# Patient Record
Sex: FEMALE | Race: WHITE
[De-identification: names, ages, dates, MRNs, and addresses within clinical notes are randomized per-mention and may not be internally consistent; named-entity substitution may affect disease eponyms.]

---

## 2021-05-28 ENCOUNTER — HOSPITAL ENCOUNTER (OUTPATIENT)
Dept: HOSPITAL 75 - PREOP | Age: 86
LOS: 4 days | Discharge: HOME | End: 2021-06-01
Attending: SPECIALIST
Payer: MEDICARE

## 2021-05-28 VITALS — WEIGHT: 180.34 LBS | HEIGHT: 66.02 IN | BODY MASS INDEX: 28.98 KG/M2

## 2021-05-28 DIAGNOSIS — Z01.818: Primary | ICD-10-CM

## 2021-06-04 ENCOUNTER — HOSPITAL ENCOUNTER (OUTPATIENT)
Dept: HOSPITAL 75 - SDC | Age: 86
Discharge: HOME | End: 2021-06-04
Attending: SPECIALIST
Payer: MEDICARE

## 2021-06-04 VITALS — DIASTOLIC BLOOD PRESSURE: 78 MMHG | SYSTOLIC BLOOD PRESSURE: 138 MMHG

## 2021-06-04 VITALS — SYSTOLIC BLOOD PRESSURE: 145 MMHG | DIASTOLIC BLOOD PRESSURE: 91 MMHG

## 2021-06-04 VITALS — HEIGHT: 66.02 IN | BODY MASS INDEX: 28.98 KG/M2 | WEIGHT: 180.34 LBS

## 2021-06-04 DIAGNOSIS — H91.8X9: ICD-10-CM

## 2021-06-04 DIAGNOSIS — Z79.82: ICD-10-CM

## 2021-06-04 DIAGNOSIS — Z79.899: ICD-10-CM

## 2021-06-04 DIAGNOSIS — H25.11: Primary | ICD-10-CM

## 2021-06-04 DIAGNOSIS — I10: ICD-10-CM

## 2021-06-04 DIAGNOSIS — M19.90: ICD-10-CM

## 2021-06-04 PROCEDURE — 66984 XCAPSL CTRC RMVL W/O ECP: CPT

## 2021-06-04 RX ADMIN — TROPICAMIDE SCH ML: 10 SOLUTION/ DROPS OPHTHALMIC at 07:43

## 2021-06-04 RX ADMIN — TETRACAINE HYDROCHLORIDE PRN ML: 5 SOLUTION OPHTHALMIC at 07:43

## 2021-06-04 RX ADMIN — TETRACAINE HYDROCHLORIDE PRN ML: 5 SOLUTION OPHTHALMIC at 07:37

## 2021-06-04 RX ADMIN — TETRACAINE HYDROCHLORIDE PRN ML: 5 SOLUTION OPHTHALMIC at 07:19

## 2021-06-04 RX ADMIN — PHENYLEPHRINE HYDROCHLORIDE SCH ML: 100 SOLUTION/ DROPS OPHTHALMIC at 07:37

## 2021-06-04 RX ADMIN — PHENYLEPHRINE HYDROCHLORIDE SCH ML: 100 SOLUTION/ DROPS OPHTHALMIC at 07:30

## 2021-06-04 RX ADMIN — TETRACAINE HYDROCHLORIDE PRN ML: 5 SOLUTION OPHTHALMIC at 07:30

## 2021-06-04 RX ADMIN — TROPICAMIDE SCH ML: 10 SOLUTION/ DROPS OPHTHALMIC at 07:37

## 2021-06-04 RX ADMIN — PHENYLEPHRINE HYDROCHLORIDE SCH ML: 100 SOLUTION/ DROPS OPHTHALMIC at 07:43

## 2021-06-04 RX ADMIN — TROPICAMIDE SCH ML: 10 SOLUTION/ DROPS OPHTHALMIC at 07:30

## 2021-06-04 NOTE — OPHTHALMOLOGIST PRE-OP NOTE
Pre-Operative Progress Note


H&P Reviewed


The H&P was reviewed, patient examined and no changes noted.


Date H&P Reviewed:  Jun 4, 2021


Time H&P Reviewed:  08:16


Pre-Op Dx


Cataract, Right Eye











MARCO GARCIA MD              Jun 4, 2021 08:16

## 2021-06-04 NOTE — ANESTHESIA-GENERAL POST-OP
MAC


Patient Condition


Mental Status/LOC:  Same as Preop


Cardiovascular:  Satisfactory


Nausea/Vomiting:  Absent


Respiratory:  Satisfactory


Pain:  Controlled


Complications:  Absent





Post Op Complications


Complications


None





Follow Up Care/Instructions


Patient Instructions


None needed.





Anesthesiology Discharge Order


Discharge Order


Patient is doing well, no complaints, stable vital signs, no apparent adverse 

anesthesia problems.   


No complications reported per nursing.











BECKY WEAVER CRNA             Jun 4, 2021 14:00

## 2021-06-04 NOTE — OPHTHALMOLOGY OPERATIVE REPORT
Cataract removal/placement IOL


PREOPERATIVE DIAGNOSIS: Cataract Right Eye


POSTOPERATIVE DIAGNOSIS: Cataract Right Eye





PROCEDURE: Cataract removal and placement of posterior chamber implant, right 

eye





SURGEON: Socrates Garcia 





ANESTHESIA: Topical with sedation





COMPLICATIONS: None





ESTIMATED BLOOD LOSS: Minimal 





DESCRIPTION OF PROCEDURE:


After proper informed consent was obtained, the patient, a 86 female, was taken 

to the Operating Room and the right eye was anesthetized with tetracaine.  The 

right eye was then prepped and draped in the usual manner.  A wire lid speculum 

was placed. A paracentesis was made at the left hand position. Preservative free

lidocaine was injected into the anterior chamber followed by viscoelastic.  A 

clear corneal incision was made in the temporal position. A capsulorrhexis was 

preformed and the central nuclear and cortical material were removed.  The 

posterior capsule was polished and Vladimir 23.0 AU00T0  IOL was placed into the 

capsular bag. The residual viscoelastic was aspirated and balanced saline 

solution was injected into the anterior chamber. Moxifloxacin  was injected into

the anterior chamber.





The wound was checked and found to be water tight.





The patient tolerated the procedure well without complications.











SOCRATES GARCIA MD              Jun 4, 2021 08:39

## 2021-06-18 ENCOUNTER — HOSPITAL ENCOUNTER (OUTPATIENT)
Dept: HOSPITAL 75 - SDC | Age: 86
End: 2021-06-18
Attending: SPECIALIST
Payer: MEDICARE

## 2021-06-18 VITALS — SYSTOLIC BLOOD PRESSURE: 138 MMHG | DIASTOLIC BLOOD PRESSURE: 72 MMHG

## 2021-06-18 VITALS — SYSTOLIC BLOOD PRESSURE: 125 MMHG | DIASTOLIC BLOOD PRESSURE: 73 MMHG

## 2021-06-18 VITALS — HEIGHT: 65.75 IN | BODY MASS INDEX: 29.33 KG/M2 | WEIGHT: 180.34 LBS

## 2021-06-18 DIAGNOSIS — Z79.899: ICD-10-CM

## 2021-06-18 DIAGNOSIS — M19.90: ICD-10-CM

## 2021-06-18 DIAGNOSIS — I10: ICD-10-CM

## 2021-06-18 DIAGNOSIS — H25.12: Primary | ICD-10-CM

## 2021-06-18 PROCEDURE — 66984 XCAPSL CTRC RMVL W/O ECP: CPT

## 2021-06-18 RX ADMIN — TROPICAMIDE SCH ML: 10 SOLUTION/ DROPS OPHTHALMIC at 07:48

## 2021-06-18 RX ADMIN — TETRACAINE HYDROCHLORIDE PRN ML: 5 SOLUTION OPHTHALMIC at 07:53

## 2021-06-18 RX ADMIN — TETRACAINE HYDROCHLORIDE PRN ML: 5 SOLUTION OPHTHALMIC at 07:47

## 2021-06-18 RX ADMIN — PHENYLEPHRINE HYDROCHLORIDE SCH ML: 100 SOLUTION/ DROPS OPHTHALMIC at 08:00

## 2021-06-18 RX ADMIN — PHENYLEPHRINE HYDROCHLORIDE SCH ML: 100 SOLUTION/ DROPS OPHTHALMIC at 07:48

## 2021-06-18 RX ADMIN — TROPICAMIDE SCH ML: 10 SOLUTION/ DROPS OPHTHALMIC at 07:53

## 2021-06-18 RX ADMIN — PHENYLEPHRINE HYDROCHLORIDE SCH ML: 100 SOLUTION/ DROPS OPHTHALMIC at 07:53

## 2021-06-18 RX ADMIN — TROPICAMIDE SCH ML: 10 SOLUTION/ DROPS OPHTHALMIC at 08:00

## 2021-06-18 RX ADMIN — TETRACAINE HYDROCHLORIDE PRN ML: 5 SOLUTION OPHTHALMIC at 07:39

## 2021-06-18 RX ADMIN — TETRACAINE HYDROCHLORIDE PRN ML: 5 SOLUTION OPHTHALMIC at 08:00

## 2021-06-18 NOTE — ANESTHESIA-GENERAL POST-OP
MAC


Patient Condition


Mental Status/LOC:  Same as Preop


Cardiovascular:  Satisfactory


Nausea/Vomiting:  Absent


Respiratory:  Satisfactory


Pain:  Controlled


Complications:  Absent





Post Op Complications


Complications


None





Follow Up Care/Instructions


Patient Instructions


None needed.





Anesthesiology Discharge Order


Discharge Order


Patient was seen after the procedure and she was doing well, no complaints, 

stable vital signs, no apparent adverse anesthesia problems.











EDWARD HUFF 18, 2021 15:55

## 2021-06-18 NOTE — OPHTHALMOLOGIST PRE-OP NOTE
Pre-Operative Progress Note


H&P Reviewed


The H&P was reviewed, patient examined and no changes noted.


Date H&P Reviewed:  Jun 18, 2021


Time H&P Reviewed:  07:25


Pre-Op Dx


Cataract, Left Eye











MARCO GARCIA MD             Jun 18, 2021 07:26

## 2021-06-18 NOTE — OPHTHALMOLOGY OPERATIVE REPORT
Cataract removal/placement IOL


PREOPERATIVE DIAGNOSIS:    Cataract Left Eye


POSTOPERATIVE DIAGNOSIS: Cataract Left Eye





PROCEDURE: Cataract removal and placement of posterior chamber implant, left eye





SURGEON: Socrates Garcia 





ANESTHESIA: Topical with sedation





COMPLICATIONS: None





ESTIMATED BLOOD LOSS: Minimal 





DESCRIPTION OF PROCEDURE:


After proper informed consent was obtained, the patient, a 86 female, was taken 

to the Operating Room and the left eye was anesthetized with tetracaine.  The 

left eye was then prepped and draped in the usual manner.  A wire lid speculum 

was placed. A paracentesis was made at the left hand position. Preservative free

lidocaine was injected into the anterior chamber followed by viscoelastic.  A 

clear corneal incision was made in the temporal position. A capsulorrhexis was 

preformed and the central nuclear and cortical material were removed.  The 

posterior capsule was polished and an Vladimir 23.5 AU00T0 was placed into the 

capsular bag. The residual viscoelastic was aspirated and balanced saline 

solution was injected into the anterior chamber.  Moxifloxacin was injected into

the anterior chamber.





The wound was checked and found to be water tight.





The patient tolerated the procedure well without complications.











SOCRATES GARCIA MD             Jun 18, 2021 09:01

## 2022-07-25 ENCOUNTER — HOSPITAL ENCOUNTER (OUTPATIENT)
Dept: HOSPITAL 75 - GIR | Age: 87
End: 2022-07-25
Attending: FAMILY MEDICINE
Payer: COMMERCIAL

## 2022-07-25 DIAGNOSIS — Z01.89: Primary | ICD-10-CM

## 2022-07-25 LAB
ALBUMIN SERPL-MCNC: 4.1 GM/DL (ref 3.2–4.5)
ALP SERPL-CCNC: 76 U/L (ref 40–136)
ALT SERPL-CCNC: 15 U/L (ref 0–55)
BILIRUB SERPL-MCNC: 0.7 MG/DL (ref 0.1–1)
BUN/CREAT SERPL: 19
CALCIUM SERPL-MCNC: 9.5 MG/DL (ref 8.5–10.1)
CHLORIDE SERPL-SCNC: 102 MMOL/L (ref 98–107)
CK MB SERPL-MCNC: 2.6 NG/ML (ref ?–6.6)
CK SERPL-CCNC: 99 U/L (ref 29–168)
CO2 SERPL-SCNC: 22 MMOL/L (ref 21–32)
CREAT SERPL-MCNC: 0.98 MG/DL (ref 0.6–1.3)
GFR SERPLBLD BASED ON 1.73 SQ M-ARVRAT: 56 ML/MIN
GLUCOSE SERPL-MCNC: 125 MG/DL (ref 70–105)
POTASSIUM SERPL-SCNC: 4.3 MMOL/L (ref 3.6–5)
PROT SERPL-MCNC: 7.1 GM/DL (ref 6.4–8.2)
SODIUM SERPL-SCNC: 140 MMOL/L (ref 135–145)

## 2022-07-25 PROCEDURE — 82550 ASSAY OF CK (CPK): CPT

## 2022-07-25 PROCEDURE — 84484 ASSAY OF TROPONIN QUANT: CPT

## 2022-07-25 PROCEDURE — 82553 CREATINE MB FRACTION: CPT

## 2022-07-25 PROCEDURE — 84443 ASSAY THYROID STIM HORMONE: CPT

## 2022-07-25 PROCEDURE — 83874 ASSAY OF MYOGLOBIN: CPT

## 2022-07-25 PROCEDURE — 80053 COMPREHEN METABOLIC PANEL: CPT
